# Patient Record
Sex: FEMALE | Race: WHITE | NOT HISPANIC OR LATINO | URBAN - METROPOLITAN AREA
[De-identification: names, ages, dates, MRNs, and addresses within clinical notes are randomized per-mention and may not be internally consistent; named-entity substitution may affect disease eponyms.]

---

## 2024-01-23 ENCOUNTER — OFFICE VISIT (OUTPATIENT)
Dept: DERMATOLOGY | Facility: CLINIC | Age: 79
End: 2024-01-23
Payer: MEDICARE

## 2024-01-23 VITALS — HEART RATE: 82 BPM | DIASTOLIC BLOOD PRESSURE: 85 MMHG | SYSTOLIC BLOOD PRESSURE: 138 MMHG

## 2024-01-23 DIAGNOSIS — C44.319 BASAL CELL CARCINOMA (BCC) OF SKIN OF OTHER PART OF FACE: ICD-10-CM

## 2024-01-23 DIAGNOSIS — D48.5 NEOPLASM OF UNCERTAIN BEHAVIOR OF SKIN: ICD-10-CM

## 2024-01-23 PROCEDURE — 13132 CMPLX RPR F/C/C/M/N/AX/G/H/F: CPT | Performed by: DERMATOLOGY

## 2024-01-23 PROCEDURE — 1126F AMNT PAIN NOTED NONE PRSNT: CPT | Performed by: DERMATOLOGY

## 2024-01-23 PROCEDURE — 88305 TISSUE EXAM BY PATHOLOGIST: CPT | Performed by: DERMATOLOGY

## 2024-01-23 PROCEDURE — 11102 TANGNTL BX SKIN SINGLE LES: CPT | Performed by: DERMATOLOGY

## 2024-01-23 PROCEDURE — 17311 MOHS 1 STAGE H/N/HF/G: CPT | Performed by: DERMATOLOGY

## 2024-01-23 RX ORDER — CALCIUM CARBONATE 260MG(650)
TABLET,CHEWABLE ORAL
COMMUNITY

## 2024-01-23 RX ORDER — CIPROFLOXACIN AND DEXAMETHASONE 3; 1 MG/ML; MG/ML
SUSPENSION/ DROPS AURICULAR (OTIC) 2 TIMES DAILY
COMMUNITY
Start: 2022-06-07

## 2024-01-23 RX ORDER — DENOSUMAB 60 MG/ML
INJECTION SUBCUTANEOUS
COMMUNITY

## 2024-01-23 RX ORDER — ZINC GLUCONATE 100 MG
100 TABLET ORAL DAILY
COMMUNITY

## 2024-01-23 RX ORDER — MELOXICAM 15 MG/1
TABLET ORAL
COMMUNITY

## 2024-01-23 NOTE — PROGRESS NOTES
Office Visit Note  Date: 1/23/2024  Surgeon:  Ra Serrano MD PhD  Office Location: [address]  [address]  [facility] [address]  Dept: [phone]  Dept Fax: [phone]  Referring Provider: Davis Garcia MD  [address]   [address]  [place] [id number]    [facility]   Berenice Armas is a 78 y.o. female who presents for the following: MOHS Surgery (Left cheek BCC)    According to the patient, the lesion has been present for approximately 6 months at the time of diagnosis.  The lesion is not causing symptoms.  The lesion has not been treated previously.    The patient does not have a pacemaker / defibrillator.  The patient does have a heart valve / joint replacement.    The patient is not on blood thinners.  The patient does not have a history of hepatitis B or C.  The patient does not have a history of HIV.  The patient does not have a history of immunosuppression (e.g. organ transplantation, malignancy, medications)    Review of Systems:  No other skin or systemic complaints other than what is documented elsewhere in the note.    MEDICAL HISTORY: clinically relevant history including significant past medical history, medications and allergies was reviewed and documented in Epic.    Objective   Well appearing patient in no apparent distress; mood and affect are within normal limits.  Vital signs: See record.  Noted on the left cheek  Is a 0.5 x 0.7 cm scar                  Left Chin  Erythematous papule              The patient confirmed the identified site.    Discussion:  The nature of the diagnosis was explained. The lesion is a skin cancer.  It has a risk of local growth and distant spread. The condition is associated with sun exposure.  Warning signs of non-melanoma skin cancer discussed. Patient was instructed to perform monthly self skin examination.  We recommended that the patient have regular full skin exams given an increased risk of subsequent skin cancers.  The patient was instructed to use sun protective behaviors including use of broad spectrum sunscreens and sun protective clothing to reduce risk of skin cancers.      Risks, benefits, side effects of Mohs surgery were discussed with patient and the patient voiced understanding.  It was explained that even though the cure rate of Mohs is very high it is not 100%. Risks of surgery including but not limited to bleeding, infection, numbness, nerve damage, and scar were reviewed.  Discussion included wound care requirements, activity restrictions, likely scar outcome and time to heal.     After Mohs surgery, the defect may need to be repaired surgically and the scar may be longer than the original lesion.  Reconstruction options, risks, and benefits were reviewed including second intention healing, linear repair (4-1 ratio was explained), local flaps, skin grafts, cartilage grafts and interpolation flaps (the need for multiple surgeries was explained). Possible outcomes were reviewed including likely scar appearance, failure of flap survival, infection, bleeding and the need for revision surgery.     The pathology was reviewed, the photograph was reviewed, and the referring physician's note was reviewed.    Patient elected for Mohs surgery.

## 2024-01-23 NOTE — PROGRESS NOTES
Mohs Surgery Operative Note    Date of Surgery:  1/23/2024  Surgeon:  Ra Serrano MD PhD  Office Location: [address]  [address]  [facility] [address]  Dept: [phone]  Dept Fax: [phone]  Referring Provider: Davis Garcia MD  [address] [facility] [address]  [place] [address]      Assessment/Plan   Pre-procedure:   Obtained informed consent: written from patient  The surgical site was identified and confirmed with the patient.     Intra-operative:   Audible time out called at : 12:45 PM 01/23/24  by: Promise Hinojosa MA   Verified patient name, birthdate, site, specimen bottle label & requisition.    The planned procedure(s) was again reviewed with the patient. The risks of bleeding, infection, nerve damage and scarring were reviewed. Written authorization was obtained. The patient identity, surgical site, and planned procedure(s) were verified. The provider acted as both surgeon and pathologist.     Basal cell carcinoma (BCC) of skin of other part of face  left cheek    Mohs surgery    Consent obtained: written    Universal Protocol:  Procedure explained and questions answered to patient or proxy's satisfaction: Yes    Test results available and properly labeled: Yes    Pathology report reviewed: Yes    External notes reviewed: Yes    Photo or diagram used for site identification: Yes    Site/side marked: Yes    Slide independently reviewed by Mohs surgeon: Yes    Immediately prior to procedure a time out was called: Yes    Patient identity confirmed: verbally with patient  Preparation: Patient was prepped and draped in usual sterile fashion      Anticoagulation:  Is the patient taking prescription anticoagulant and/or aspirin prescribed/recommended by a physician? No    Was the anticoagulation regimen changed prior to Mohs? No      Anesthesia:  Anesthesia method: local infiltration  Local anesthetic: lidocaine 1% WITH epi    Procedure Details:  Biopsy accession  number: [id number]  Date of biopsy: 11/8/2023  Frozen section biopsy performed: No    Specimen debulked: Yes    Pre-Op diagnosis: basal cell carcinoma  BCC subtype: nodular  Surgery side: left  Surgical site (from skin exam): left cheek  Pre-operative length (cm): 0.5  Pre-operative width (cm): 0.7  Indications for Mohs surgery: anatomic location where tissue conservation is critical  Previously treated? No      Micrographic Surgery Details:  Post-operative length (cm): 1.1  Post-operative width (cm): 1.3  Number of Mohs stages: 1    Stage 1     Comments: The patient was brought into the operating room and placed in the procedure chair in the appropriate position.  The area positive by previous biopsy was identified and confirmed with the patient. The area of clinically obvious tumor was debulked using a curette and/or scalpel as needed. An incision was made following the Mohs approach through the skin. The specimen was taken to the lab, divided into 2 piece(s) and appropriately chromacoded and processed.           Tumor features identified on Mohs section: no tumor identified    Depth of defect: subcutaneous fat    Patient tolerance of procedure: tolerated well, no immediate complications    Reconstruction:  Was the defect reconstructed? Yes    Was reconstruction performed by the same Mohs surgeon? Yes    Setting of reconstruction: outpatient office  When was reconstruction performed? same day  Type of reconstruction: linear  Linear reconstruction: complex  Length of linear repair (cm): 3.3  Subcutaneous Layers (Deep Stitches)   Suture size:  5-0  Suture type:  Vicryl  Fine/surface layer approximation (top stitches)   Epidermal/Superficial suture size:  5-0  Epidermal/Superficial suture type:  Fast-absorbing gut  Stitches: simple running    Hemostasis achieved with: pressure and electrodesiccation  Outcome: patient tolerated procedure well with no complications    Post-procedure details: sterile dressing applied     Dressing type: petrolatum, Telfa pad, Hypafix and pressure dressing      Neoplasm of uncertain behavior of skin  Left Chin    Lesion biopsy  Type of biopsy: tangential    Informed consent: discussed and consent obtained    Timeout: patient name, date of birth, surgical site, and procedure verified    Procedure prep:  Patient was prepped and draped  Anesthesia: the lesion was anesthetized in a standard fashion    Anesthetic:  1% lidocaine w/ epinephrine 1-100,000 local infiltration  Instrument used: #15 blade    Hemostasis achieved with: pressure, aluminum chloride and electrodesiccation    Outcome: patient tolerated procedure well    Post-procedure details: sterile dressing applied and wound care instructions given    Dressing type: petrolatum and bandage      Specimen 1 - Dermatopathology- DERM LAB  Differential Diagnosis: r/o NMSC  Check Margins Yes/No?:  Yes  Comments:    Dermpath Lab: Routine Histopathology (formalin-fixed tissue)        Complex Linear Repair - Wide Undermining:  Given the location and size of the defect, it was determined that a complex layered linear closure was required to restore normal anatomy and function. The repair was considered complex because extensive undermining was required and performed. The amount of undermining performed was greater than the maximum width of the defect as measured perpendicular to the closure line along at least one entire edge of the defect. Standing cutaneous cones were removed using Burow's triangles. The wound edges were brought into close approximation with buried vertical mattress sutures. The remainder of the wound was then closed with epidermal top sutures.      The final repair measured 3.3 cm    Wound care was discussed, and the patient was given written post-operative wound care instructions.      The patient will follow up with Ra Serrano MD PhD as needed for any post operative problems or concerns, and will follow up with their primary  dermatologist as scheduled.

## 2024-01-23 NOTE — LETTER
MOH's Provider/Referral Letter Treatment Plan    Patient: Berenice Armas   YOB: 1945   Date of Visit: 1/23/2024   MRN: [id number]     Davis Garcia MD  [address] [facility]  [address]  [place] [address]    Dear Davis Garcia MD,     I had the pleasure of seeing Berenice Armas today in consultation at your request for evaluation and treatment of:  1. Basal cell carcinoma (BCC) of skin of other part of face  left cheek    Mohs surgery    Staff Communication: Dermatology Local Anesthesia: 1 % Lidocaine / Epinephrine - Amount: 6cc    2. Neoplasm of uncertain behavior of skin  Left Chin    Lesion biopsy    Staff Communication: Dermatology Local Anesthesia: Site Location: left chin 1 % Lidocaine / Epinephrine - Amount: 1cc    Specimen 1 - Dermatopathology- DERM LAB  Differential Diagnosis: r/o NMSC  Check Margins Yes/No?:  Yes  Comments:    Dermpath Lab: Routine Histopathology (formalin-fixed tissue)      Mohs surgery was indicated because of the nature of the lesion and the need to obtain the highest cure rate.  After informed consent was obtained, the patient underwent the procedure without complication.    The skin cancer was removed, wound care instructions were given and the patient was advised to follow up with you.  I will see the patient post-operatively as indicated.    Thank you very much for your confidence in me and for allowing me to share in the care of this patient.    1. Basal cell carcinoma (BCC) of skin of other part of face  left cheek  Is a 0.5 x 0.7 cm scar                  Mohs surgery    Consent obtained: written    Universal Protocol:  Procedure explained and questions answered to patient or proxy's satisfaction: Yes    Test results available and properly labeled: Yes    Pathology report reviewed: Yes    External notes reviewed: Yes    Photo or diagram used for site identification: Yes    Site/side marked: Yes    Slide independently reviewed by Mohs surgeon: Yes    Immediately prior to  procedure a time out was called: Yes    Patient identity confirmed: verbally with patient  Preparation: Patient was prepped and draped in usual sterile fashion      Anticoagulation:  Is the patient taking prescription anticoagulant and/or aspirin prescribed/recommended by a physician? No    Was the anticoagulation regimen changed prior to Mohs? No      Anesthesia:  Anesthesia method: local infiltration  Local anesthetic: lidocaine 1% WITH epi    Procedure Details:  Biopsy accession number: [id number]  Date of biopsy: 11/8/2023  Frozen section biopsy performed: No    Specimen debulked: Yes    Pre-Op diagnosis: basal cell carcinoma  BCC subtype: nodular  Surgery side: left  Surgical site (from skin exam): left cheek  Pre-operative length (cm): 0.5  Pre-operative width (cm): 0.7  Indications for Mohs surgery: anatomic location where tissue conservation is critical  Previously treated? No      Micrographic Surgery Details:  Post-operative length (cm): 1.1  Post-operative width (cm): 1.3  Number of Mohs stages: 1    Stage 1     Comments: The patient was brought into the operating room and placed in the procedure chair in the appropriate position.  The area positive by previous biopsy was identified and confirmed with the patient. The area of clinically obvious tumor was debulked using a curette and/or scalpel as needed. An incision was made following the Mohs approach through the skin. The specimen was taken to the lab, divided into 2 piece(s) and appropriately chromacoded and processed.           Tumor features identified on Mohs section: no tumor identified    Depth of defect: subcutaneous fat    Patient tolerance of procedure: tolerated well, no immediate complications    Reconstruction:  Was the defect reconstructed? Yes    Was reconstruction performed by the same Mohs surgeon? Yes    Setting of reconstruction: outpatient office  When was reconstruction performed? same day  Type of reconstruction: linear  Linear  reconstruction: complex  Length of linear repair (cm): 3.3  Subcutaneous Layers (Deep Stitches)   Suture size:  5-0  Suture type:  Vicryl  Fine/surface layer approximation (top stitches)   Epidermal/Superficial suture size:  5-0  Epidermal/Superficial suture type:  Fast-absorbing gut  Stitches: simple running    Hemostasis achieved with: pressure and electrodesiccation  Outcome: patient tolerated procedure well with no complications    Post-procedure details: sterile dressing applied    Dressing type: petrolatum, Telfa pad, Hypafix and pressure dressing      Staff Communication: Dermatology Local Anesthesia: 1 % Lidocaine / Epinephrine - Amount: 6cc    2. Neoplasm of uncertain behavior of skin  Left Chin  Erythematous papule          Lesion biopsy  Type of biopsy: tangential    Informed consent: discussed and consent obtained    Timeout: patient name, date of birth, surgical site, and procedure verified    Procedure prep:  Patient was prepped and draped  Anesthesia: the lesion was anesthetized in a standard fashion    Anesthetic:  1% lidocaine w/ epinephrine 1-100,000 local infiltration  Instrument used: #15 blade    Hemostasis achieved with: pressure, aluminum chloride and electrodesiccation    Outcome: patient tolerated procedure well    Post-procedure details: sterile dressing applied and wound care instructions given    Dressing type: petrolatum and bandage      Staff Communication: Dermatology Local Anesthesia: Site Location: left chin 1 % Lidocaine / Epinephrine - Amount: 1cc    Specimen 1 - Dermatopathology- DERM LAB  Differential Diagnosis: r/o NMSC  Check Margins Yes/No?:  Yes  Comments:    Dermpath Lab: Routine Histopathology (formalin-fixed tissue)           Sincerely,       Ra Serrano MD PhD  [facility]

## 2024-01-26 LAB
LABORATORY COMMENT REPORT: NORMAL
PATH REPORT.FINAL DX SPEC: NORMAL
PATH REPORT.GROSS SPEC: NORMAL
PATH REPORT.RELEVANT HX SPEC: NORMAL
PATH REPORT.TOTAL CANCER: NORMAL

## 2024-02-06 ENCOUNTER — OFFICE VISIT (OUTPATIENT)
Dept: OTOLARYNGOLOGY | Facility: CLINIC | Age: 79
End: 2024-02-06
Payer: MEDICARE

## 2024-02-06 VITALS
WEIGHT: 129 LBS | DIASTOLIC BLOOD PRESSURE: 88 MMHG | TEMPERATURE: 97.4 F | BODY MASS INDEX: 21.49 KG/M2 | HEIGHT: 65 IN | SYSTOLIC BLOOD PRESSURE: 149 MMHG

## 2024-02-06 DIAGNOSIS — H61.23 BILATERAL IMPACTED CERUMEN: ICD-10-CM

## 2024-02-06 DIAGNOSIS — H92.03 OTALGIA OF BOTH EARS: Primary | ICD-10-CM

## 2024-02-06 PROCEDURE — 69210 REMOVE IMPACTED EAR WAX UNI: CPT | Performed by: OTOLARYNGOLOGY

## 2024-02-06 PROCEDURE — 1160F RVW MEDS BY RX/DR IN RCRD: CPT | Performed by: OTOLARYNGOLOGY

## 2024-02-06 PROCEDURE — 1036F TOBACCO NON-USER: CPT | Performed by: OTOLARYNGOLOGY

## 2024-02-06 PROCEDURE — 1126F AMNT PAIN NOTED NONE PRSNT: CPT | Performed by: OTOLARYNGOLOGY

## 2024-02-06 PROCEDURE — 1159F MED LIST DOCD IN RCRD: CPT | Performed by: OTOLARYNGOLOGY

## 2024-02-06 PROCEDURE — 99213 OFFICE O/P EST LOW 20 MIN: CPT | Performed by: OTOLARYNGOLOGY

## 2024-02-06 NOTE — PROGRESS NOTES
Impression:              1. Otalgia of both ears        2. Bilateral impacted cerumen             Recommendations/Plan:  Using the operative microscope and curette I was able to remove all of her impacted cerumen today and she was feeling much better.  In the future she will restart Debrox wax softening drops and I would be happy to clean her ears out over the next 6 to 9 months.    **This electronic medical record note was created with the use of voice recognition software.  Despite proofreading, typographical or grammatical errors may be present that could affect meaning of content **    Subjective:  Berenice presents to the office today complaining of wax impaction with pressure in both ears.  She has been using wax softening drops over the last week or so.  She denies any infectious drainage fever or chills.  Her hearing is slightly muffled.    Objective:    Visit Vitals  /88   Temp 36.3 °C (97.4 °F) (Temporal)        Current Outpatient Medications   Medication Instructions    CALCIUM 26-VIT D3-MAGNESIUM 15 ORAL     ciprofloxacin-dexamethasone (CiproDEX) otic suspension otic (ear), 2 times daily    denosumab (Prolia) 60 mg/mL syringe as directed Subcutaneous every 6 months    levothyroxine (Synthroid, Levoxyl) 6.25 mcg split tablet oral    magnesium citrate 100 mg tablet oral    meloxicam (Mobic) 15 mg tablet oral    vitamin D3-folic acid 5,750 unit- 1 mg capsule Cholecalciferol (Vitamin D3) (VITAMIN  MC1) 125 MCG (5,000 UNIT) CAPSULE Active 125 MCG PO EVERY DAY    vitamin k 100 mcg, oral, Daily        No Known Allergies     Physical Exam:  Right ear-external canal occluded with cerumen.  Using the operative microscope and curette I was able to remove this and she was feeling much better.  TM intact with good mobility.  No effusion.  Mastoid nontender.  Left ear-external canal occluded with cerumen.  Again using the operative microscope and curette I was able to remove this and she was feeling better.   TM intact with good mobility.  No effusion.  Mastoid nontender.    Results:  []    Procedure:  After informed consent was obtained with the risks, benefits, complications, and alternatives explained to the patient / guardian, the patient was laid back in the ENT chair and the operative microscope was brought to the [left] ear.  A speculum was placed and using the operative microscope and/or curette/ suction, I was able to carefully remove all of the impacted cerumen that was causing pain/ hearing loss.  After doing so, the patient was feeling much better and had much less pain.  The TM was [intact, translucent and had good mobility with my pneumatic otoscope].  The head was rotated to the opposite side and attention was brought to the [right] ear.  A speculum was placed and using the operative microscope and/or curette/ suction, I was able to remove all of the impacted cerumen that was causing pain and hearing loss.  After doing so, the patient was feeling much better and had much less pain.  The TM was [ intact, translucent and had good mobility with my pneumatic otoscope].  The patient tolerated the procedure well and there were no complications.      Garcia Bruner DO

## 2024-02-09 NOTE — RESULT ENCOUNTER NOTE
Patient informed of results and message sent to [facility] [place] to make appointment.

## 2024-03-22 ENCOUNTER — PROCEDURE VISIT (OUTPATIENT)
Dept: DERMATOLOGY | Facility: CLINIC | Age: 79
End: 2024-03-22
Payer: MEDICARE

## 2024-03-22 VITALS — DIASTOLIC BLOOD PRESSURE: 73 MMHG | HEART RATE: 76 BPM | SYSTOLIC BLOOD PRESSURE: 108 MMHG

## 2024-03-22 DIAGNOSIS — C44.319 BASAL CELL CARCINOMA (BCC) OF SKIN OF OTHER PART OF FACE: ICD-10-CM

## 2024-03-22 PROCEDURE — 13132 CMPLX RPR F/C/C/M/N/AX/G/H/F: CPT | Performed by: DERMATOLOGY

## 2024-03-22 PROCEDURE — 17311 MOHS 1 STAGE H/N/HF/G: CPT | Performed by: DERMATOLOGY

## 2024-03-22 PROCEDURE — 17312 MOHS ADDL STAGE: CPT | Performed by: DERMATOLOGY

## 2024-03-22 NOTE — PROGRESS NOTES
Mohs Surgery Operative Note    Date of Surgery:  3/22/2024  Surgeon:  Ra Serrano MD PhD  Office Location: [address] [address]  Dept: [phone]  Dept Fax: [phone]  Referring Provider: Ra Serrano MD PhD  [address] [facility] [address], [address] [address]      Assessment/Plan   Pre-procedure:   Obtained informed consent: written from patient  The surgical site was identified and confirmed with the patient.     Intra-operative:   Audible time out called at : 11:25 AM 03/22/24  by: Rosalia Rodríguez RN   Verified patient name, birthdate, site, specimen bottle label & requisition.    The planned procedure(s) was again reviewed with the patient. The risks of bleeding, infection, nerve damage and scarring were reviewed. Written authorization was obtained. The patient identity, surgical site, and planned procedure(s) were verified. The provider acted as both surgeon and pathologist.     Basal cell carcinoma (BCC) of skin of other part of face  Left Chin    Mohs surgery    Consent obtained: written    Universal Protocol:  Procedure explained and questions answered to patient or proxy's satisfaction: Yes    Test results available and properly labeled: Yes    Pathology report reviewed: Yes    External notes reviewed: Yes    Photo or diagram used for site identification: Yes    Site/side marked: Yes    Slide independently reviewed by Mohs surgeon: Yes    Immediately prior to procedure a time out was called: Yes    Patient identity confirmed: verbally with patient  Preparation: Patient was prepped and draped in usual sterile fashion      Anticoagulation:  Is the patient taking prescription anticoagulant and/or aspirin prescribed/recommended by a physician? No    Was the anticoagulation regimen changed prior to Mohs? No      Anesthesia:  Anesthesia method: local infiltration  Local anesthetic: lidocaine 1% WITH epi and sodium  bicarbonate    Procedure Details:  Biopsy accession number: [id number]  Date of biopsy: 1/23/2014  Frozen section biopsy performed: No    Specimen debulked: Yes    Pre-Op diagnosis: basal cell carcinoma  BCC subtype: superficial  Surgery side: left  Surgical site (from skin exam): Left Chin  Pre-operative length (cm): 0.5  Pre-operative width (cm): 1.5  Indications for Mohs surgery: anatomic location where tissue conservation is critical  Previously treated? No      Micrographic Surgery Details:  Post-operative length (cm): 2  Post-operative width (cm): 2.4  Number of Mohs stages: 2  Is this a complex case (associate members only): Yes      Stage 1     Comments: The patient was brought into the operating room and placed in the procedure chair in the appropriate position.  The area positive by previous biopsy was identified and confirmed with the patient. The area of clinically obvious tumor was debulked using a curette and/or scalpel as needed. An incision was made following the Mohs approach through the skin. The specimen was taken to the lab, divided into 2 piece(s) and appropriately chromacoded and processed.    .  Tumor was seen on the lateral margins as indicated on the on the Mohs map.  Superficial basal cell carcinoma. Histologic examination revealed small buds of atypical basaloid cells with peripheral palisading and tumoral clefting.             Tumor features identified on Mohs section: basal carcinoma    Stage 2     Comments: The area of positivity as noted on the Mohs map in the previous stage was identified and removed using the Mohs technique. The specimen was taken to the lab and appropriately chromacoded and processed in 1 piece(s).               Tumor features identified on Mohs section: no tumor identified    Depth of defect: subcutaneous fat    Patient tolerance of procedure: tolerated well, no immediate complications    Reconstruction:  Was the defect reconstructed? Yes    Was reconstruction  performed by the same Mohs surgeon? Yes    Setting of reconstruction: outpatient office  When was reconstruction performed? same day  Type of reconstruction: linear  Linear reconstruction: complex  Length of linear repair (cm): 6  Subcutaneous Layers (Deep Stitches)   Suture size:  5-0  Suture type:  Vicryl  Stitches:  Buried vertical mattress  Fine/surface layer approximation (top stitches)   Epidermal/Superficial suture size:  5-0  Epidermal/Superficial suture type:  Fast-absorbing gut  Stitches: simple running    Hemostasis achieved with: electrodesiccation  Outcome: patient tolerated procedure well with no complications    Post-procedure details: sterile dressing applied and wound care instructions given    Dressing type: petrolatum, Hypafix, pressure dressing and Telfa pad                          The final repair measured 6 cm    Wound care was discussed, and the patient was given written post-operative wound care instructions.      The patient will follow up with Ra Serrano MD PhD as needed for any post operative problems or concerns, and will follow up with their primary dermatologist as scheduled.

## 2024-03-22 NOTE — LETTER
[facility]'s Provider/Referral Letter Treatment Plan    Patient: Berenice Armas   YOB: 1945   Date of Visit: 3/22/2024   MRN: [id number]     Davis Garcia MD  [address] [facility].  [address]  [place] [address]    Dear Davis Garcia MD,    I had the pleasure of seeing Berenice Armas today in consultation at your request for evaluation and treatment of:  1. Basal cell carcinoma (BCC) of skin of other part of face  Left Chin    Mohs surgery    Staff Communication: Dermatology Local Anesthesia: 1 % Lidocaine / Epinephrine - Amount:12cc's      Mohs surgery was indicated because of the nature of the lesion and the need to obtain the highest cure rate.  After informed consent was obtained, the patient underwent the procedure without complication.    The skin cancer was removed, wound care instructions were given and the patient was advised to follow up with you.  I will see the patient post-operatively as indicated.    Thank you very much for your confidence in me and for allowing me to share in the care of this patient.    1. Basal cell carcinoma (BCC) of skin of other part of face  Left Chin  Is a 0.5 x 1.5 cm scar                              Mohs surgery    Consent obtained: written    Universal Protocol:  Procedure explained and questions answered to patient or proxy's satisfaction: Yes    Test results available and properly labeled: Yes    Pathology report reviewed: Yes    External notes reviewed: Yes    Photo or diagram used for site identification: Yes    Site/side marked: Yes    Slide independently reviewed by Mohs surgeon: Yes    Immediately prior to procedure a time out was called: Yes    Patient identity confirmed: verbally with patient  Preparation: Patient was prepped and draped in usual sterile fashion      Anticoagulation:  Is the patient taking prescription anticoagulant and/or aspirin prescribed/recommended by a physician? No    Was the anticoagulation regimen changed prior to Mohs? No       Anesthesia:  Anesthesia method: local infiltration  Local anesthetic: lidocaine 1% WITH epi and sodium bicarbonate    Procedure Details:  Biopsy accession number: [id number]  Date of biopsy: 1/23/2014  Frozen section biopsy performed: No    Specimen debulked: Yes    Pre-Op diagnosis: basal cell carcinoma  BCC subtype: superficial  Surgery side: left  Surgical site (from skin exam): Left Chin  Pre-operative length (cm): 0.5  Pre-operative width (cm): 1.5  Indications for Mohs surgery: anatomic location where tissue conservation is critical  Previously treated? No      Micrographic Surgery Details:  Post-operative length (cm): 2  Post-operative width (cm): 2.4  Number of Mohs stages: 2  Is this a complex case (associate members only): Yes      Stage 1     Comments: The patient was brought into the operating room and placed in the procedure chair in the appropriate position.  The area positive by previous biopsy was identified and confirmed with the patient. The area of clinically obvious tumor was debulked using a curette and/or scalpel as needed. An incision was made following the Mohs approach through the skin. The specimen was taken to the lab, divided into 2 piece(s) and appropriately chromacoded and processed.    .  Tumor was seen on the lateral margins as indicated on the on the Mohs map.  Superficial basal cell carcinoma. Histologic examination revealed small buds of atypical basaloid cells with peripheral palisading and tumoral clefting.             Tumor features identified on Mohs section: basal carcinoma    Stage 2     Comments: The area of positivity as noted on the Mohs map in the previous stage was identified and removed using the Mohs technique. The specimen was taken to the lab and appropriately chromacoded and processed in 1 piece(s).               Tumor features identified on Mohs section: no tumor identified    Depth of defect: subcutaneous fat    Patient tolerance of procedure: tolerated well,  no immediate complications    Reconstruction:  Was the defect reconstructed? Yes    Was reconstruction performed by the same Mohs surgeon? Yes    Setting of reconstruction: outpatient office  When was reconstruction performed? same day  Type of reconstruction: linear  Linear reconstruction: complex  Length of linear repair (cm): 6  Subcutaneous Layers (Deep Stitches)   Suture size:  5-0  Suture type:  Vicryl  Stitches:  Buried vertical mattress  Fine/surface layer approximation (top stitches)   Epidermal/Superficial suture size:  5-0  Epidermal/Superficial suture type:  Fast-absorbing gut  Stitches: simple running    Hemostasis achieved with: electrodesiccation  Outcome: patient tolerated procedure well with no complications    Post-procedure details: sterile dressing applied and wound care instructions given    Dressing type: petrolatum, Hypafix, pressure dressing and Telfa pad      Staff Communication: Dermatology Local Anesthesia: 1 % Lidocaine / Epinephrine - Amount:12cc's           Sincerely,       Ra Serrano MD PhD  [facility]

## 2024-03-22 NOTE — PROGRESS NOTES
Office Visit Note  Date: 3/22/2024  Surgeon:  Ra Serrano MD PhD  Office Location: [address] [address]  Dept: [phone]  Dept Fax: [phone]  Referring Provider: Ra Serrano MD PhD  [address] [place] [address]  [address], [address],  [facility]    [facility]   Berenice Armas is a 78 y.o. female who presents for the following: MOHS Surgery (Left Chin, BCC)    According to the patient, the lesion has been present for approximately greater than 1 year at the time of diagnosis.  The lesion is itchy.  The lesion has not been treated previously.    The patient does not have a pacemaker / defibrillator.  The patient does not have a heart valve / joint replacement.    The patient is not on blood thinners.  The patient does not have a history of hepatitis B or C.  The patient does not have a history of HIV.  The patient does not have a history of immunosuppression (e.g. organ transplantation, malignancy, medications)    Review of Systems:  No other skin or systemic complaints other than what is documented elsewhere in the note.    MEDICAL HISTORY: clinically relevant history including significant past medical history, medications and allergies was reviewed and documented in Epic.    Objective   Well appearing patient in no apparent distress; mood and affect are within normal limits.  Vital signs: See record.  Noted on the Left Chin  Is a 0.5 x 1.5 cm scar                          The patient confirmed the identified site.    Discussion:  The nature of the diagnosis was explained. The lesion is a skin cancer.  It has a risk of local growth and distant spread. The condition is associated with sun exposure.  Warning signs of non-melanoma skin cancer discussed. Patient was instructed to perform monthly self skin examination.  We recommended that the patient have regular full skin exams given an increased risk of subsequent skin cancers. The patient was instructed  to use sun protective behaviors including use of broad spectrum sunscreens and sun protective clothing to reduce risk of skin cancers.      Risks, benefits, side effects of Mohs surgery were discussed with patient and the patient voiced understanding.  It was explained that even though the cure rate of Mohs is very high it is not 100%. Risks of surgery including but not limited to bleeding, infection, numbness, nerve damage, and scar were reviewed.  Discussion included wound care requirements, activity restrictions, likely scar outcome and time to heal.     After Mohs surgery, the defect may need to be repaired surgically and the scar may be longer than the original lesion.  Reconstruction options, risks, and benefits were reviewed including second intention healing, linear repair (4-1 ratio was explained), local flaps, skin grafts, cartilage grafts and interpolation flaps (the need for multiple surgeries was explained). Possible outcomes were reviewed including likely scar appearance, failure of flap survival, infection, bleeding and the need for revision surgery.     The pathology was reviewed, the photograph was reviewed, and the referring physician's note was reviewed.    Patient elected for Mohs surgery.

## 2024-04-26 ENCOUNTER — HOSPITAL ENCOUNTER (OUTPATIENT)
Dept: RADIOLOGY | Facility: CLINIC | Age: 79
Discharge: HOME | End: 2024-04-26
Payer: MEDICARE

## 2024-04-26 DIAGNOSIS — M81.0 OSTEOPOROSIS: ICD-10-CM

## 2024-04-26 PROCEDURE — 77080 DXA BONE DENSITY AXIAL: CPT | Performed by: STUDENT IN AN ORGANIZED HEALTH CARE EDUCATION/TRAINING PROGRAM

## 2024-04-26 PROCEDURE — 77080 DXA BONE DENSITY AXIAL: CPT

## 2024-10-21 ENCOUNTER — APPOINTMENT (OUTPATIENT)
Dept: OTOLARYNGOLOGY | Facility: CLINIC | Age: 79
End: 2024-10-21
Payer: MEDICARE

## 2024-10-21 VITALS
SYSTOLIC BLOOD PRESSURE: 134 MMHG | HEIGHT: 65 IN | TEMPERATURE: 97.3 F | BODY MASS INDEX: 21.47 KG/M2 | DIASTOLIC BLOOD PRESSURE: 80 MMHG

## 2024-10-21 DIAGNOSIS — H92.03 OTALGIA OF BOTH EARS: Primary | ICD-10-CM

## 2024-10-21 DIAGNOSIS — H61.23 BILATERAL IMPACTED CERUMEN: ICD-10-CM

## 2024-10-21 PROCEDURE — 1160F RVW MEDS BY RX/DR IN RCRD: CPT | Performed by: OTOLARYNGOLOGY

## 2024-10-21 PROCEDURE — 69210 REMOVE IMPACTED EAR WAX UNI: CPT | Performed by: OTOLARYNGOLOGY

## 2024-10-21 PROCEDURE — 1036F TOBACCO NON-USER: CPT | Performed by: OTOLARYNGOLOGY

## 2024-10-21 PROCEDURE — 1159F MED LIST DOCD IN RCRD: CPT | Performed by: OTOLARYNGOLOGY

## 2024-10-21 NOTE — PROGRESS NOTES
Impression:              1. Otalgia of both ears        2. Bilateral impacted cerumen             Recommendations/Plan:  Using the operative microscope and suction I was able to remove all of her impacted cerumen today and she was feeling much better.  In the future she will restart Debrox wax softening drops and I would be happy to clean her ears out on an annual basis.    **This electronic medical record note was created with the use of voice recognition software.  Despite proofreading, typographical or grammatical errors may be present that could affect meaning of content **    Subjective:  Berenice returns to the office today complaining that her ears feel plugged.  She feels like her ears are full of wax once again.  She denies any drainage fever or chills.  No upper respiratory complaints.    Objective:    Visit Vitals  /80   Temp 36.3 °C (97.3 °F) (Temporal)        Current Outpatient Medications   Medication Instructions    CALCIUM 26-VIT D3-MAGNESIUM 15 ORAL     ciprofloxacin-dexamethasone (CiproDEX) otic suspension otic (ear), 2 times daily    denosumab (Prolia) 60 mg/mL syringe as directed Subcutaneous every 6 months    levothyroxine (Synthroid, Levoxyl) 6.25 mcg split tablet Take by mouth.    magnesium citrate 100 mg tablet Take by mouth.    meloxicam (Mobic) 15 mg tablet oral    vitamin D3-folic acid 5,750 unit- 1 mg capsule Cholecalciferol (Vitamin D3) (VITAMIN  MC1) 125 MCG (5,000 UNIT) CAPSULE Active 125 MCG PO EVERY DAY    vitamin k 100 mcg, Daily        No Known Allergies     Physical Exam:  Right ear-external canal occluded with cerumen.  Using the operative microscope and suction I was able to remove this and she was feeling much better.  TM intact.  No effusion.  Mastoid nontender.  Left ear-external canal occluded with cerumen.  Again using the operative microscope and suction, I was able to remove this and she was feeling better.  TM intact.  No effusion.  Mastoid  nontender.    Results:  []    Procedure:  After informed consent was obtained with the risks, benefits, complications, and alternatives explained to the patient / guardian, the patient was laid back in the ENT chair and the operative microscope was brought to the [left] ear.  A speculum was placed and using the operative microscope and/or curette/ suction, I was able to carefully remove all of the impacted cerumen that was causing pain/ hearing loss.  After doing so, the patient was feeling much better and had much less pain.  The TM was [intact, translucent and had good mobility with my pneumatic otoscope].  The head was rotated to the opposite side and attention was brought to the [right] ear.  A speculum was placed and using the operative microscope and/or curette/ suction, I was able to remove all of the impacted cerumen that was causing pain and hearing loss.  After doing so, the patient was feeling much better and had much less pain.  The TM was [ intact, translucent and had good mobility with my pneumatic otoscope].  The patient tolerated the procedure well and there were no complications.      Garcia Bruner, DO

## 2025-11-17 ENCOUNTER — APPOINTMENT (OUTPATIENT)
Dept: NEUROLOGY | Facility: CLINIC | Age: 80
End: 2025-11-17
Payer: MEDICARE

## 2026-01-07 ENCOUNTER — APPOINTMENT (OUTPATIENT)
Dept: NEUROLOGY | Facility: CLINIC | Age: 81
End: 2026-01-07
Payer: MEDICARE